# Patient Record
Sex: MALE | Race: ASIAN | NOT HISPANIC OR LATINO
[De-identification: names, ages, dates, MRNs, and addresses within clinical notes are randomized per-mention and may not be internally consistent; named-entity substitution may affect disease eponyms.]

---

## 2024-05-22 PROBLEM — Z00.00 ENCOUNTER FOR PREVENTIVE HEALTH EXAMINATION: Status: ACTIVE | Noted: 2024-05-22

## 2024-05-23 ENCOUNTER — NON-APPOINTMENT (OUTPATIENT)
Age: 64
End: 2024-05-23

## 2024-05-23 ENCOUNTER — APPOINTMENT (OUTPATIENT)
Dept: CARDIOLOGY | Facility: CLINIC | Age: 64
End: 2024-05-23
Payer: MEDICAID

## 2024-05-23 VITALS
TEMPERATURE: 97.1 F | SYSTOLIC BLOOD PRESSURE: 138 MMHG | OXYGEN SATURATION: 98 % | WEIGHT: 178 LBS | RESPIRATION RATE: 18 BRPM | HEART RATE: 67 BPM | DIASTOLIC BLOOD PRESSURE: 84 MMHG

## 2024-05-23 VITALS — HEIGHT: 68.9 IN | BODY MASS INDEX: 26.36 KG/M2

## 2024-05-23 DIAGNOSIS — K29.60 OTHER GASTRITIS W/OUT BLEEDING: ICD-10-CM

## 2024-05-23 DIAGNOSIS — F17.200 NICOTINE DEPENDENCE, UNSPECIFIED, UNCOMPLICATED: ICD-10-CM

## 2024-05-23 PROCEDURE — 99203 OFFICE O/P NEW LOW 30 MIN: CPT | Mod: 25

## 2024-05-23 PROCEDURE — 93000 ELECTROCARDIOGRAM COMPLETE: CPT

## 2024-05-23 NOTE — ASSESSMENT
[FreeTextEntry1] : 63 year old M smoker who presents for cardiac evaluation.  Pt reports epigastric at night and feelings of hunger x 1 week. Pt saw Dr. Monroe who started PPI with resolution of symptoms. Pt also underwent EGD showing gastritis. He reports unlimited exercise tolerance. He denies chest pain or SOB. No palpitations, dizziness, LOC, orthopnea, PND, or LE edema.   Meds: PPI  1) Epigastric discomfort, attributed to gastritis given resolution of symptoms with PPI 2) Smoker - EKG showed sinus rhythm without ischemic changes - Pt is asymptomatic at this time - He is euvolemic on exam - We discussed that if he has recurrent epigastric discomfort despite GI treatment, he should undergo treadmill stress to r/o ischemia. I also recommended TTE to r/o structural heart disease. Patient states he will monitor symptoms and follow-up if symptomatic - Advised pt to STOP smoking cigarettes  2) Follow-up, if recurrent/persistent symptoms

## 2024-05-23 NOTE — HISTORY OF PRESENT ILLNESS
[FreeTextEntry1] : 63 year old M smoker who presents for cardiac evaluation.  Pt reports epigastric at night and feelings of hunger x 1 week. Pt saw Dr. Monroe who started PPI with resolution of symptoms. Pt also underwent EGD showing gastritis. He reports unlimited exercise tolerance. He denies chest pain or SOB. No palpitations, dizziness, LOC, orthopnea, PND, or LE edema.   Meds: PPI
